# Patient Record
Sex: MALE | Race: WHITE | NOT HISPANIC OR LATINO | Employment: FULL TIME | URBAN - METROPOLITAN AREA
[De-identification: names, ages, dates, MRNs, and addresses within clinical notes are randomized per-mention and may not be internally consistent; named-entity substitution may affect disease eponyms.]

---

## 2023-02-17 ENCOUNTER — OFFICE VISIT (OUTPATIENT)
Dept: URGENT CARE | Facility: URGENT CARE | Age: 48
End: 2023-02-17

## 2023-02-17 VITALS
OXYGEN SATURATION: 98 % | DIASTOLIC BLOOD PRESSURE: 72 MMHG | HEART RATE: 72 BPM | TEMPERATURE: 97.2 F | SYSTOLIC BLOOD PRESSURE: 111 MMHG

## 2023-02-17 DIAGNOSIS — L50.9 URTICARIA: Primary | ICD-10-CM

## 2023-02-17 PROCEDURE — 99203 OFFICE O/P NEW LOW 30 MIN: CPT | Performed by: PHYSICIAN ASSISTANT

## 2023-02-17 RX ORDER — PREDNISONE 20 MG/1
40 TABLET ORAL DAILY
Qty: 10 TABLET | Refills: 0 | Status: SHIPPED | OUTPATIENT
Start: 2023-02-17 | End: 2023-02-22

## 2023-02-17 NOTE — PATIENT INSTRUCTIONS
Patient was educated on the natural course of condition. Take medication as directed. Side effect discussed. Conservative measures discussed including over-the-counter antihistamines (Benadryl).  Try to identify potential allergens and do your best to avoid them. See your primary care provider if symptoms do not improve in 7 days. Seek emergency care if you develop severe swelling, difficulty swallowing, or difficulty breathing.

## 2023-02-17 NOTE — PROGRESS NOTES
URGENT CARE VISIT:    SUBJECTIVE:   HPI:   Humberto Allen is a 47 year old who presents with rash located all over body except  Face since 1 week(s) ago. Rash is gradual onset and rash seems to be stable. He describes rash as itching and red. Patient denies difficulty breathing or throat/tongue swelling. Patient has tried benadryl with no relief of symptoms. Patient has not had contact exposures to new laundry detergents, soaps, lotions, or other potential irritants. Denies new foods or medications.  Patient admits to previous history of a similar rash. No one around them has had a similar rash.     PMH: History reviewed. No pertinent past medical history.  Allergies: Patient has no known allergies.   Medications:   Current Outpatient Medications   Medication Sig Dispense Refill     predniSONE (DELTASONE) 20 MG tablet Take 2 tablets (40 mg) by mouth daily for 5 days 10 tablet 0     Social History:   Social History     Socioeconomic History     Marital status:      Spouse name: Not on file     Number of children: Not on file     Years of education: Not on file     Highest education level: Not on file   Occupational History     Not on file   Tobacco Use     Smoking status: Not on file     Smokeless tobacco: Not on file   Substance and Sexual Activity     Alcohol use: Not on file     Drug use: Not on file     Sexual activity: Not on file   Other Topics Concern     Not on file   Social History Narrative     Not on file     Social Determinants of Health     Financial Resource Strain: Not on file   Food Insecurity: Not on file   Transportation Needs: Not on file   Physical Activity: Not on file   Stress: Not on file   Social Connections: Not on file   Intimate Partner Violence: Not on file   Housing Stability: Not on file       ROS: ROS otherwise found to be negative except as noted above.    OBJECTIVE:  /72 (BP Location: Right arm, Patient Position: Sitting, Cuff Size: Adult Regular)   Pulse 72    Temp 97.2  F (36.2  C) (Tympanic)   SpO2 98%   General: WDWN in NAD.   Eyes: Non-injected conjunctivas without drainage bilaterally.  Ears: Bilateral TMs are easily visualized without erythema, injection, or effusion. No erythema or edema of external canals.    Oropharynx: No erythema of oropharynx. No edema of tongue.   Cardiac: RRR without murmurs, rubs, or gallops.  Respiratory: LCTAB without adventitious sounds. Non-labored breathing.  Integumentary:   Distribution: generalized  Location: torso and all 4 extremities     Color: red,  Lesion type: wheals, blotchy with warmth  Neuro: Alert and oriented.       ASSESSMENT:     ICD-10-CM    1. Urticaria  L50.9 predniSONE (DELTASONE) 20 MG tablet           PLAN:  Patient Instructions   Patient was educated on the natural course of condition. Take medication as directed. Side effect discussed. Conservative measures discussed including over-the-counter antihistamines (Benadryl).  Try to identify potential allergens and do your best to avoid them. See your primary care provider if symptoms do not improve in 7 days. Seek emergency care if you develop severe swelling, difficulty swallowing, or difficulty breathing.    Patient verbalized understanding and is agreeable to plan. The patient was discharged ambulatory and in stable condition.    Carole Bullock PA-C on 2/17/2023 at 1:00 PM